# Patient Record
Sex: MALE | Race: WHITE | Employment: OTHER | URBAN - METROPOLITAN AREA
[De-identification: names, ages, dates, MRNs, and addresses within clinical notes are randomized per-mention and may not be internally consistent; named-entity substitution may affect disease eponyms.]

---

## 2017-10-16 ENCOUNTER — HOSPITAL ENCOUNTER (OUTPATIENT)
Dept: PHYSICAL THERAPY | Age: 72
Discharge: HOME | End: 2017-10-16
Payer: MEDICARE

## 2017-10-16 PROCEDURE — G8984 CARRY CURRENT STATUS: HCPCS

## 2017-10-16 PROCEDURE — G8985 CARRY GOAL STATUS: HCPCS

## 2017-10-16 PROCEDURE — 97140 MANUAL THERAPY 1/> REGIONS: CPT

## 2017-10-16 PROCEDURE — 97161 PT EVAL LOW COMPLEX 20 MIN: CPT

## 2017-10-16 NOTE — PROGRESS NOTES
[facility]rapy Center at [facility]   [address], [address]  [phone]   [phone]    Sarah Lewis  : 1945       OUTPATIENT PHYSICAL THERAPY:Initial Assessment 10/16/2017    ICD-10: Treatment Diagnosis:   M54.2 Cervicalgia     Precautions/Allergies:   Review of patient's allergies indicates no known allergies. Fall Risk Score: 1 (? 5 = High Risk)  MD Orders: Eval and treat MEDICAL/REFERRING DIAGNOSIS:  Cervicalgia [M54.2]   DATE OF ONSET: Aug 10, 2017  REFERRING PHYSICIAN: Carlitos Howard MD  RETURN PHYSICIAN APPOINTMENT: 6 months     INITIAL ASSESSMENT:  Mr. Vish Glynn presents     PROBLEM LIST (Impacting functional limitations):  1. Increased Pain  2. Decreased Flexibility/Joint Mobility INTERVENTIONS PLANNED:  1. Home Exercise Program (HEP)  2. Manual Therapy  3. Therapeutic Activites  4. Therapeutic Exercise/Strengthening   TREATMENT PLAN:  Effective Dates: 10/16/17 TO 18. Frequency/Duration: 1-2 visits if needed upon reassessment,  will adjust frequency and duration as progress indicates. GOALS: (Goals have been discussed and agreed upon with patient.)  Short-Term Functional Goals: Time Frame:   1. Establish independent HEP with no cueing. Discharge Goals: Time Frame:   1. Improve score on  Rehabilitation Potential For Stated Goals: Excellent  Regarding Braulio Hudson's therapy, I certify that the treatment plan above will be carried out by a therapist or under their direction. Thank you for this referral,  Talib Ren, PT     Referring Physician Signature: Carlitos Howard MD              Date                    The information in this section was collected on 10/16/17 (except where otherwise noted). HISTORY:   History of Present Injury/Illness (Reason for Referral):    Past Medical History/Comorbidities:   Mr. Vish Glynn  has a past medical history of Acute prostatitis; BPH (benign prostatic hypertrophy);  Elevated prostate specific antigen (PSA); Hypertrophy of prostate without urinary obstruction and other lower urinary tract symptoms (LUTS); and Urinary tract infection, site not specified. Mr. Carlton Gonzalez  has a past surgical history that includes prostate biopsy, needle, saturation sampling and tonsillectomy. Social History/Living Environment:      Prior Level of Function/Work/Activity:    Dominant Side:         RIGHT    Current Medications:       Current Outpatient Prescriptions:     naproxen sodium (ALEVE) 220 mg tablet, Take 220 mg by mouth two (2) times a day., Disp: , Rfl:     finasteride (PROSCAR) 5 mg tablet, TAKE 1 TABLET EVERY DAY, Disp: 90 Tab, Rfl: 11    aspirin (ASPIRIN) 325 mg tablet, Take 325 mg by mouth daily. 1/2 tab po daily, Disp: , Rfl:     multivitamin (ONE A DAY) tablet, Take 1 Tab by mouth daily. , Disp: , Rfl:     acebutolol (SECTRAL) 400 mg capsule, Take  by mouth two (2) times a day., Disp: , Rfl:     omeprazole (PRILOSEC) 20 mg capsule, Take 20 mg by mouth daily. , Disp: , Rfl:     OMEGA-3 FATTY ACIDS (FISH OIL CONCENTRATE PO), Take 1,000 mg by mouth., Disp: , Rfl:    Date Last Reviewed:  10/16/2017     Number of Personal Factors/Comorbidities that affect the Plan of Care: 1-2: MODERATE COMPLEXITY   EXAMINATION:   Observation/Orthostatic Postural Assessment:            Palpation:                ROM:                                            Strength:                          Special Tests:   Neurological Screen:   Balance: Body Structures Involved:  1. Bones  2. Joints  3. Muscles  4. Ligaments Body Functions Affected:  1. Sensory/Pain  2. Neuromusculoskeletal  3. Movement Related Activities and Participation Affected:  1. Learning and Applying Knowledge  2. General Tasks and Demands  3. Mobility  4. Domestic Life  5.  Community, Social and [place]   Number of elements (examined above) that affect the Plan of Care: 3: MODERATE COMPLEXITY   CLINICAL PRESENTATION:   Presentation: Evolving clinical presentation with changing clinical characteristics: MODERATE COMPLEXITY   CLINICAL DECISION MAKING:   Outcome Measure: Tool Used: Neck Disability Index (NDI)  Score:  Initial: 4/50  Most Recent: X/50 (Date: -- )   Interpretation of Score: The Neck Disability Index is a revised form of the Oswestry Low Back Pain Index and is designed to measure the activities of daily living in person's with neck pain. Each section is scored on a 0-5 scale, 5 representing the greatest disability. The scores of each section are added together for a total score of 50. Score 0 1-10 11-20 21-30 31-40 41-49 50   Modifier CH CI CJ CK CL CM CN     ? Carrying, Moving, and Handling Objects:     - CURRENT STATUS: CI - 1%-19% impaired, limited or restricted    - GOAL STATUS: CI - 1%-19% impaired, limited or restricted    - D/C STATUS:  ---------------To be determined---------------      Medical Necessity:   · Patient is expected to demonstrate progress in strength and range of motion to increase independence with in home and community activities. Reason for Services/Other Comments:  · Patient continues to require skilled intervention due to pain at cervical region. Use of outcome tool(s) and clinical judgement create a POC that gives a: Clear prediction of patient's progress: LOW COMPLEXITY            TREATMENT:   (In addition to Assessment/Re-Assessment sessions the following treatments were rendered)  Pre-treatment Symptoms/Complaints:  Pain and tightness in cervical region  Pain: Initial: Pain Intensity 1: 5  Post Session:  1     THERAPEUTIC EXERCISE: (0 minutes):  Exercises per grid below to improve mobility and strength. Required moderate verbal and manual cues to promote proper body alignment, promote proper body posture and promote proper body mechanics. Progressed resistance, range and repetitions as indicated.     Evaluation (  xx   ):    Manual Therapy (      ):     Therapeutic Modalities:    HEP: As above; handouts given to patient for all exercises. Treatment/Session Assessment:    · Response to Treatment:  . · Compliance with Program/Exercises: Will assess as treatment progresses. · Recommendations/Intent for next treatment session: \"Next visit will focus on pain and ROM at neck if needed  Otherwise DC\".   Total Treatment Duration:  PT Patient Time In/Time Out  Time In: 0900  Time Out: 1000  Treatment number [address], PT

## 2017-10-16 NOTE — PROGRESS NOTES
Ambulatory/Rehab Services H2 Model Falls Risk Assessment    Risk Factor Pts. ·   Confusion/Disorientation/Impulsivity  []    4 ·   Symptomatic Depression  []   2 ·   Altered Elimination  []   1 ·   Dizziness/Vertigo  []   1 ·   Gender (Male)  [x]   1 ·   Any administered antiepileptics (anticonvulsants):  []   2 ·   Any administered benzodiazepines:  []   1 ·   Visual Impairment (specify):  []   1 ·   Portable Oxygen Use  []   1 ·   Orthostatic ? BP  []   1 ·   History of Recent Falls (within 3 mos.)  []   5     Ability to Rise from Chair (choose one) Pts. ·   Ability to rise in a single movement  [x]   0 ·   Pushes up, successful in one attempt  []   1 ·   Multiple attempts, but successful  []   3 ·   Unable to rise without assistance  []   4   Total: (5 or greater = High Risk) 1     Falls Prevention Plan:   []                Physical Limitations to Exercise (specify):   []                Mobility Assistance Device (type):   []                Exercise/Equipment Adaptation (specify):    ©2010 [facility] [address] Patent #[id number].  Federal Law prohibits the replication, distribution or use without written permission from [facility]

## 2022-06-28 ENCOUNTER — HOSPITAL ENCOUNTER (OUTPATIENT)
Dept: PHYSICAL THERAPY | Age: 77
Setting detail: RECURRING SERIES
Discharge: HOME | End: 2022-07-01
Payer: MEDICARE

## 2022-06-28 PROCEDURE — 97140 MANUAL THERAPY 1/> REGIONS: CPT

## 2022-06-28 PROCEDURE — 97161 PT EVAL LOW COMPLEX 20 MIN: CPT

## 2022-06-28 PROCEDURE — 97110 THERAPEUTIC EXERCISES: CPT

## 2022-06-28 ASSESSMENT — PAIN SCALES - GENERAL: PAINLEVEL_OUTOF10: 4

## 2022-06-28 NOTE — PLAN OF CARE
Meganil Shoulder  : 1945  Primary: [facility] [employer]  Secondary:  [address] @ [facility]  [address] [place] [address] [facility] [address]  Phone: [phone]  Fax: [phone] Plan Frequency: 2x/week for details    Plan of Care/Certification Expiration Date: 22      PT Visit Info:    No data recorded    OUTPATIENT PHYSICAL THERAPY:OP NOTE TYPE: Initial Assessment 2022               Episode  Appt Desk         Treatment Diagnosis:  Pain in left hip (M25.552)  Stiffness of Left Hip, Not elsewhere classified (M25.652)  * No diagnoses found *  Medical/Referring Diagnosis:  No admission diagnoses are documented for this encounter. Referring Physician:  Salazar Sanz MD MD Orders:  PT Eval and Treat   Return MD Appt:    Date of Onset:  Onset Date: 22     Allergies:  Patient has no allergy information on record. Restrictions/Precautions:    No data recordedNo data recorded   Medications Last Reviewed:  2022     SUBJECTIVE   History of Injury/Illness (Reason for Referral):  Pt states he is having L inner thigh pain since 2022. Pain is located only in the left inner quad, more proximal to midline. Pain is worst in the morning and takes 30min-2hr to subside. Pain described as a little sharp, stronger than dull. Denies numbness/tingling, denies R sided pain. Does not recall any DAVI, does not recall any change in overall activity level. Aggs: sleeping on stomach, squats    Patient Stated Goal(s):  \"Return to prior level of function\"  Initial:     4/10 Post Session:     2/10  Past Medical History/Comorbidities:   Mr. Magdy Moore  has no past medical history on file. Mr. Magdy Moore  has no past surgical history on file.   Social History/Living Environment:   Lives With: Spouse  Type of Home: House     Prior Level of Function/Work/Activity:   Prior level of function: Yard work, house work  Prior level of function: Independent  Occupation: Retired  No data recordedNo data recorded   Learning:   Does the patient/guardian have any barriers to learning?: No barriers  Will there be a co-learner?: No  What is the preferred language of the patient/guardian?: English  Is an  required?: No  How does the patient/guardian prefer to learn new concepts?: Listening; Reading; Demonstration; Pictures/Videos     Fall Risk Scale: Total Score: 0  Foley Fall Risk: Low (0-24)     Personal Factors:        Sex:  male        Age:  68 y.o.        OBJECTIVE   Observation  Posture: normal  Gait: normal      ROM   Right Left   Flexion 120 120   Extension 10 10   ER 50 50   IR 30 25*     Strength (all MMT scores are graded on a scale of 0-5)   Right Left   Hip      Flexion 5 5   Abduction 4+ 4+   Extension 5 4   Glute Max 5 4   ER 5 5   IR 5 5   Knee     Extension 5 5   Flexion 5 5         Joint/Soft Tissue Mobility   Description   Joint Mobility  Hip: normal, no pain   Lumbar: normal, no pain   Knee:   Soft Tissue Mobility No tone or TP       Special Tests   Right Left   FADDIR - +   HELDER - +   Scour's - +   Log Roll - +     Functional Mobility Screen   DL Squat: normal, no pain x10      ASSESSMENT   Initial Assessment:  Mr. Soham Hardy presents to physical therapy with MD diagnosis of left hip pain. Pt demonstrated signs and symptoms consistent with this diagnosis. On objective examination, the patient demonstrated deficits in ROM, strength, joint mobility, soft tissue mobility, functional ability and functional mobility. The patient also had increased pain. The patient is limited in the following activities: walking, sitting, standing, lifting, bending, ADLs, functional tasks. The patient has a good  prognosis for recovery based on the examination findings and may be limited by: N/A. Patient requires skilled physical therapy services in order to return to prior level of function. Problem List: (Impacting functional limitations):     Body Structures, Functions, Activity Limitations Requiring Skilled Therapeutic Intervention: Decreased functional mobility ; Decreased ADL status; Decreased ROM; Decreased body mechanics; Decreased strength; Decreased endurance; Decreased high-level IADLs; Increased pain; Decreased posture     Therapy Prognosis:   Therapy Prognosis: Good     Assessment Complexity:   Low Complexity  PLAN   Effective Dates: 06/28/22 TO Plan of Care/Certification Expiration Date: 09/26/22     Frequency/Duration: Plan Frequency: 2x/week for details     Interventions Planned (Treatment may consist of any combination of the following):    Current Treatment Recommendations: Strengthening; ROM; Functional mobility training; ADL/Self-care training; Endurance training; Neuromuscular re-education; Manual Therapy - Soft Tissue Mobilization; Manual Therapy - Joint Manipulation; Pain management; Home exercise program; Safety education & training; Patient/Caregiver education & training; Modalities; Integrated dry needling; Therapeutic activities     Goals: (Goals have been discussed and agreed upon with patient.)  Discharge Goals: Time Frame: 5 weeks  1. Patient will be compliant with progressive HEP  2. Pt will be able to do 3x10 squats without pain         Outcome Measure: Tool Used: Lower Extremity Functional Scale (LEFS)  Score:  Initial: 75/80 Most Recent: X/80 (Date: -- )   Interpretation of Score: 20 questions each scored on a 5 point scale with 0 representing \"extreme difficulty or unable to perform\" and 4 representing \"no difficulty\". The lower the score, the greater the functional disability. 80/80 represents no disability. Minimal detectable change is 9 points. Medical Necessity:    Patient is expected to demonstrate progress in strength, range of motion, coordination and functional technique to increase independence with ADLs and functional tasks.   Reason For Services/Other Comments:   Patient requires skilled physical therapy in order to return to prior level of function  Total Duration:       Regarding Rico Cole's therapy, I certify that the treatment plan above will be carried out by a therapist or under their direction.   Thank you for this referral,  Jenn Marion, PT     Referring Physician Signature: Azael Rod MD _______________________________ Date _____________        Post Session Pain  Charge Capture  PT Visit Info  POC Link  Treatment Note Link  MD Guidelines  MyChart

## 2022-06-28 NOTE — PROGRESS NOTES
Evon Vasquez  : 1945  Primary: [facility] [employer]  Secondary:  [address] @ [facility]  [address] [place] [address] [address]  Phone: [phone]  Fax: [phone] Plan Frequency: 2x/week for details    Plan of Care/Certification Expiration Date: 22      PT Visit Info:   No data recorded    OUTPATIENT PHYSICAL THERAPY:OP NOTE TYPE: Treatment Note 2022       Episode  }Appt Desk              Treatment Diagnosis:  Pain in left hip (M25.552)  Stiffness of Left Hip, Not elsewhere classified (M25.652)  Medical/Referring Diagnosis:  No admission diagnoses are documented for this encounter. Referring Physician:  Judit Gray MD MD Orders:  PT Eval and Treat   Date of Onset:  Onset Date: 22     Allergies:   Patient has no allergy information on record. Restrictions/Precautions:  No data recordedNo data recorded   Interventions Planned (Treatment may consist of any combination of the following):    Current Treatment Recommendations: Strengthening; ROM; Functional mobility training; ADL/Self-care training; Endurance training; Neuromuscular re-education; Manual Therapy - Soft Tissue Mobilization; Manual Therapy - Joint Manipulation; Pain management; Home exercise program; Safety education & training; Patient/Caregiver education & training; Modalities; Integrated dry needling; Therapeutic activities     Subjective Comments:  See Evaluation Note from today for details  Initial:}    4/10Post Session:       2/10  Medications Last Reviewed:  2022  Updated Objective Findings:  See evaluation note from today  Treatment   THERAPEUTIC EXERCISE: (15 minutes):    Exercises per grid below to improve mobility, strength and balance. Required minimal visual, verbal, manual and tactile cues to promote proper body posture and promote proper body mechanics. Progressed resistance, range, repetitions and complexity of movement as indicated.      Date:   Date: Date:     Activity/Exercise Parameters Parameters Parameters   Education Plan of car, prognosis, anatomy, HEP     NuStep      Lumbar Rotation x15 B     SKTC [id number] B     Bridges 2x10                   MANUAL THERAPY: (15 minutes):   Joint mobilization, Soft tissue mobilization and Manipulation was utilized and necessary because of the patient's restricted joint motion, painful spasm, loss of articular motion and restricted motion of soft tissue. Date:  6/27 Date:   Date:     Activity/Exercise Parameters Parameters Parameters   Soft Tissue      Joint Mobilization Hip: Lateral/post-inf grades III-IV     Dry Needling            Treatment/Session Summary:    · Treatment Assessment:  See Evaluation Note from today for details  · Communication/Consultation:  None today  · Equipment provided today:  None  · Recommendations/Intent for next treatment session: Next visit will focus on progression as tolerated.     Total Treatment Billable Duration:  45 minutes   Time In: 8209  Time Out: [address], PT       Charge Capture  }Post Session Pain  PT Visit Info  [facility] Portal  MD Guidelines  Scanned Media  Benefits  MyChart    Future Appointments   Date Time Provider Deshawn Correa   6/30/2022 11:00 AM Aleah Martines, PT [facility]   7/5/2022  3:15 PM Aleah Martines, PT [facility]   7/7/2022  9:30 AM Aleah Martines, PT [facility]   7/18/2022 11:00 AM Aleah Martines, PT [facility]   7/21/2022  9:30 AM Aleah Martines, PT [facility]

## 2022-06-30 ENCOUNTER — HOSPITAL ENCOUNTER (OUTPATIENT)
Dept: PHYSICAL THERAPY | Age: 77
Setting detail: RECURRING SERIES
Discharge: HOME | End: 2022-07-03
Payer: MEDICARE

## 2022-06-30 PROCEDURE — 97110 THERAPEUTIC EXERCISES: CPT

## 2022-06-30 PROCEDURE — 97140 MANUAL THERAPY 1/> REGIONS: CPT

## 2022-06-30 ASSESSMENT — PAIN SCALES - GENERAL: PAINLEVEL_OUTOF10: 0

## 2022-06-30 NOTE — PROGRESS NOTES
Casimiro Dwyer  : 1945  Primary: [facility] [employer]  Secondary:  [address] @ [facility]  [address] [facility] [place] [address]  Phone: [phone]  Fax: [phone] Plan Frequency: 2x/week for details    Plan of Care/Certification Expiration Date: 22      PT Visit Info:   No data recorded    OUTPATIENT PHYSICAL THERAPY:OP NOTE TYPE: Treatment Note 2022       Episode  }Appt Desk              Treatment Diagnosis:  Pain in left hip (M25.552)  Stiffness of Left Hip, Not elsewhere classified (M25.652)  Medical/Referring Diagnosis:  No admission diagnoses are documented for this encounter. Referring Physician:  Edd Villalta MD MD Orders:  PT Eval and Treat   Date of Onset:  Onset Date: 22     Allergies:   Patient has no allergy information on record. Restrictions/Precautions:  No data recordedNo data recorded   Interventions Planned (Treatment may consist of any combination of the following):    Current Treatment Recommendations: Strengthening; ROM; Functional mobility training; ADL/Self-care training; Endurance training; Neuromuscular re-education; Manual Therapy - Soft Tissue Mobilization; Manual Therapy - Joint Manipulation; Pain management; Home exercise program; Safety education & training; Patient/Caregiver education & training; Modalities; Integrated dry needling; Therapeutic activities     Subjective Comments:  After I came and exercised I was quite sore. I did my exercises this morning - I didn't do them in bed and laid on the floor. Initial:}    0/10Post Session:       0/10  Medications Last Reviewed:  2022  Updated Objective Findings:  See evaluation note from today  Treatment   THERAPEUTIC EXERCISE: (40 minutes):    Exercises per grid below to improve mobility, strength and balance. Required minimal visual, verbal, manual and tactile cues to promote proper body posture and promote proper body mechanics.   Progressed resistance, range, repetitions and complexity of movement as indicated. Date:  6/28 Date:  6/30 Date:     Activity/Exercise Parameters Parameters Parameters   Education Plan of car, prognosis, anatomy, HEP     NuStep  10 minutes, level 4    Lumbar Rotation x15 B 20x    SKTC [id number] B 30\"x3    Bridges 2x10 10x10\"    Gastroc Stretch  30\"x3    Side to side ambulation  No resistance, can likely add next visit. -     HRs  20x    Clamshells  10x10\" L LE    SLS  30\"x3                        MANUAL THERAPY: (2 minutes):   Joint mobilization, Soft tissue mobilization and Manipulation was utilized and necessary because of the patient's restricted joint motion, painful spasm, loss of articular motion and restricted motion of soft tissue. Date:  6/27 Date:   Date:     Activity/Exercise Parameters Parameters Parameters   Soft Tissue      Joint Mobilization Hip: Lateral/post-inf grades III-IV LAD Grade IV-     Dry Needling NOT TODAY            Treatment/Session Summary:    · Treatment Assessment:  Progress strengthening and ROM per POC. Tolerated exercises very well - reviewed sleeping positions and vaerbalized understanding. · Communication/Consultation:  None today  · Equipment provided today:  None  · Recommendations/Intent for next treatment session: Next visit will focus on progression as tolerated.     Total Treatment Billable Duration:  42 minutes   Time In: 6003  Time Out: [address]  }Post Session Pain  PT Visit Info  [facility] [place] [facility]  MD Guidelines  Scanned Media  Benefits  MyChart    Future Appointments   Date Time Provider Deshawn Correa   7/5/2022  3:15 PM CRISTIN Mancilla   7/7/2022  9:30 AM CRISTIN Mancilla   7/18/2022 11:00 AM CRISTIN Mancilla [facility]   7/21/2022  9:30 AM CRISTIN Mancilla

## 2022-07-05 ENCOUNTER — HOSPITAL ENCOUNTER (OUTPATIENT)
Dept: PHYSICAL THERAPY | Age: 77
Setting detail: RECURRING SERIES
Discharge: HOME | End: 2022-07-08
Payer: MEDICARE

## 2022-07-05 PROCEDURE — 97110 THERAPEUTIC EXERCISES: CPT

## 2022-07-05 PROCEDURE — 97140 MANUAL THERAPY 1/> REGIONS: CPT

## 2022-07-05 ASSESSMENT — PAIN SCALES - GENERAL: PAINLEVEL_OUTOF10: 0

## 2022-07-05 NOTE — PROGRESS NOTES
Silvia Wells  : 1945  Primary: [place] [employer]age [employer]  Secondary:  [address] @ [facility]  [address] [facility] Jenny [place]es [place] [address]  Phone: [phone]  Fax: [phone] Plan Frequency: 2x/week for details    Plan of Care/Certification Expiration Date: 22      PT Visit Info:   No data recorded    OUTPATIENT PHYSICAL THERAPY:OP NOTE TYPE: Treatment Note 2022       Episode  }Appt Desk              Treatment Diagnosis:  Pain in left hip (M25.552)  Stiffness of Left Hip, Not elsewhere classified (M25.652)  Medical/Referring Diagnosis:  No admission diagnoses are documented for this encounter. Referring Physician:  Yolande Mcardle, MD MD Orders:  PT Eval and Treat   Date of Onset:  Onset Date: 22     Allergies:   Patient has no allergy information on record. Restrictions/Precautions:  No data recordedNo data recorded   Interventions Planned (Treatment may consist of any combination of the following):    Current Treatment Recommendations: Strengthening; ROM; Functional mobility training; ADL/Self-care training; Endurance training; Neuromuscular re-education; Manual Therapy - Soft Tissue Mobilization; Manual Therapy - Joint Manipulation; Pain management; Home exercise program; Safety education & training; Patient/Caregiver education & training; Modalities; Integrated dry needling; Therapeutic activities     Subjective Comments:   Pt reports his hip has been feeling a little more sore recently but is feeling good today. Initial:}    0/10Post Session:       0/10  Medications Last Reviewed:  2022  Updated Objective Findings:  None Today  Treatment   THERAPEUTIC EXERCISE: (35 minutes):    Exercises per grid below to improve mobility, strength and balance. Required minimal visual, verbal, manual and tactile cues to promote proper body posture and promote proper body mechanics.   Progressed resistance, range, repetitions and complexity of movement as indicated. Date:  6/28 Date:  6/30 Date:  07/05   Activity/Exercise Parameters Parameters Parameters   Education Plan of car, prognosis, anatomy, HEP     NuStep  10 minutes, level 4 8 min lvl 4   Lumbar Rotation x15 B 20x x20   SKTC [id number] B 30\"x3    Bridges 2x10 10x10\" [id number]   Gastroc Stretch  30\"x3    Side to side ambulation  No resistance, can likely add next visit. -     HRs  20x    Clamshells  10x10\" L LE    SLS  30\"x3    S/L Hip Adduction   3x10 2lbs Ankle   Shuttle Press   3x15 150lbs   Hip Abduction   1x10 20lbs     MANUAL THERAPY: (10 minutes):   Joint mobilization, Soft tissue mobilization and Manipulation was utilized and necessary because of the patient's restricted joint motion, painful spasm, loss of articular motion and restricted motion of soft tissue. Date:  6/27 Date:   Date:     Activity/Exercise Parameters Parameters Parameters   Soft Tissue      Joint Mobilization Hip: Lateral/post-inf grades III-IV LAD Grade IV-  Hip: Lateral/post-inf grades III-IV  LAD Grade III   Dry Needling NOT TODAY            Treatment/Session Summary:    · Treatment Assessment:  Pt tolerated treatment well with continuation of strengthening exercises. · Communication/Consultation:  None today  · Equipment provided today:  None  · Recommendations/Intent for next treatment session: Next visit will focus on progression as tolerated.     Total Treatment Billable Duration:  42 minutes   Time In: 1254  Time Out: Deshawn Curtis       Charge Capture  }Post Session Pain  PT Visit Info  [address] [facility]  MD Guidelines  Scanned Media  Benefits  MyChart    Future Appointments   Date Time Provider Deshawn Correa   7/7/2022  9:30 AM CRISTIN Chan   7/18/2022 11:00 AM RCISTIN Chan [facility]   7/21/2022  9:30 AM CRISTIN Chan

## 2022-07-07 ENCOUNTER — HOSPITAL ENCOUNTER (OUTPATIENT)
Dept: PHYSICAL THERAPY | Age: 77
Setting detail: RECURRING SERIES
Discharge: HOME | End: 2022-07-10
Payer: MEDICARE

## 2022-07-07 PROCEDURE — 97140 MANUAL THERAPY 1/> REGIONS: CPT

## 2022-07-07 PROCEDURE — 97110 THERAPEUTIC EXERCISES: CPT

## 2022-07-07 ASSESSMENT — PAIN SCALES - GENERAL: PAINLEVEL_OUTOF10: 0

## 2022-07-07 NOTE — PROGRESS NOTES
Evon Vasquez  : 1945  Primary: [facility] [employer] Advantage Hmo  Secondary:  [address] @ [facility]  [address] [place] [address]  Phone: [phone]  Fax: [phone] Plan Frequency: 2x/week for details    Plan of Care/Certification Expiration Date: 22      PT Visit Info:   No data recorded    OUTPATIENT PHYSICAL THERAPY:OP NOTE TYPE: Treatment Note 2022       Episode  }Appt Desk              Treatment Diagnosis:  Pain in left hip (M25.552)  Stiffness of Left Hip, Not elsewhere classified (M25.652)  Medical/Referring Diagnosis:  No admission diagnoses are documented for this encounter. Referring Physician:  Judit Gray MD MD Orders:  PT Eval and Treat   Date of Onset:  Onset Date: 22     Allergies:   Patient has no allergy information on record. Restrictions/Precautions:  No data recordedNo data recorded   Interventions Planned (Treatment may consist of any combination of the following):    Current Treatment Recommendations: Strengthening; ROM; Functional mobility training; ADL/Self-care training; Endurance training; Neuromuscular re-education; Manual Therapy - Soft Tissue Mobilization; Manual Therapy - Joint Manipulation; Pain management; Home exercise program; Safety education & training; Patient/Caregiver education & training; Modalities; Integrated dry needling; Therapeutic activities     Subjective Comments:  Pt reports his hip has been feeling a lot better and has not needed any medication in the last two days. Initial:}    0/10Post Session:       0/10  Medications Last Reviewed:  2022  Updated Objective Findings:  None Today  Treatment   THERAPEUTIC EXERCISE: (35 minutes):    Exercises per grid below to improve mobility, strength and balance. Required minimal visual, verbal, manual and tactile cues to promote proper body posture and promote proper body mechanics.   Progressed resistance, range, repetitions and complexity of movement as indicated. Date:  6/30 Date:  07/05 Date:  07/07   Activity/Exercise Parameters Parameters    Education      NuStep 10 minutes, level 4 8 min lvl 4 10min lvl 4   Lumbar Rotation 20x x20 x20   SKTC 30\"x3     Bridges 10x10\" [id number]   Gastroc Stretch 30\"x3     Side to side ambulation No resistance, can likely add next visit. -      HRs 20x  x20   Clamshells 10x10\" L LE     SLS 30\"x3     S/L Hip Adduction  3x10 2lbs Ankle 3x10 2lbs Ankle   Shuttle Press  3x15 150lbs 3x15 150lbs DL  2x10 75 SL   Hip Abduction  1x10 20lbs 2x10 30lbs     MANUAL THERAPY: (10 minutes):   Joint mobilization, Soft tissue mobilization and Manipulation was utilized and necessary because of the patient's restricted joint motion, painful spasm, loss of articular motion and restricted motion of soft tissue. Date:  6/27 Date:   Date:  07/07   Activity/Exercise Parameters Parameters Parameters   Soft Tissue      Joint Mobilization Hip: Lateral/post-inf grades III-IV LAD Grade IV-  Hip: Lateral/post-inf grades III-IV  LAD Grade III   Dry Needling NOT TODAY            Treatment/Session Summary:    · Treatment Assessment:  Pt tolerated treatment well with continuation of manual and strength training. · Communication/Consultation:  None today  · Equipment provided today:  None  · Recommendations/Intent for next treatment session: Next visit will focus on progression as tolerated.     Total Treatment Billable Duration:  42 minutes   Time In: 0930  Time Out: [address]       Charge Capture  }Post Session Pain  PT Visit [facility] Portal  MD Guidelines  Scanned Media  Benefits  MyChart    Future Appointments   Date Time Provider Deshawn Correa   7/18/2022 11:00 AM CRISTIN Montero [facility]   7/21/2022  9:30 AM CRISTIN Montero [facility]

## 2022-07-18 ENCOUNTER — HOSPITAL ENCOUNTER (OUTPATIENT)
Dept: PHYSICAL THERAPY | Age: 77
Setting detail: RECURRING SERIES
Discharge: HOME | End: 2022-07-21
Payer: MEDICARE

## 2022-07-18 PROCEDURE — 97110 THERAPEUTIC EXERCISES: CPT

## 2022-07-18 PROCEDURE — 97140 MANUAL THERAPY 1/> REGIONS: CPT

## 2022-07-18 ASSESSMENT — PAIN SCALES - GENERAL: PAINLEVEL_OUTOF10: 1

## 2022-07-18 NOTE — PROGRESS NOTES
Mary Ellen nAn  : 1945  Primary: Basilio [place] [employer]age Hmo  Secondary:  Madeline Aragon @ [place] [address] [address]  Phone: [phone]  Fax: [phone] Plan Frequency: 2x/week for details    Plan of Care/Certification Expiration Date: 22      PT Visit Info:   No data recorded    OUTPATIENT PHYSICAL THERAPY:OP NOTE TYPE: Treatment Note 2022       Episode  }Appt Desk              Treatment Diagnosis:  Pain in left hip (M25.552)  Stiffness of Left Hip, Not elsewhere classified (M25.652)  Medical/Referring Diagnosis:  No admission diagnoses are documented for this encounter. Referring Physician:  Cami Barcenas MD MD Orders:  PT Eval and Treat   Date of Onset:  Onset Date: 22     Allergies:   Patient has no allergy information on record. Restrictions/Precautions:  No data recordedNo data recorded   Interventions Planned (Treatment may consist of any combination of the following):    Current Treatment Recommendations: Strengthening; ROM; Functional mobility training; ADL/Self-care training; Endurance training; Neuromuscular re-education; Manual Therapy - Soft Tissue Mobilization; Manual Therapy - Joint Manipulation; Pain management; Home exercise program; Safety education & training; Patient/Caregiver education & training; Modalities; Integrated dry needling; Therapeutic activities     Subjective Comments:  Pt reports his hip has been bothering him more this past week when he was on vacation. Initial:}    1/10Post Session:       1/10  Medications Last Reviewed:  2022  Updated Objective Findings:  None Today  Treatment   THERAPEUTIC EXERCISE: (35 minutes):    Exercises per grid below to improve mobility, strength and balance. Required minimal visual, verbal, manual and tactile cues to promote proper body posture and promote proper body mechanics.   Progressed resistance, range, repetitions and complexity of movement as indicated. Date:  07/05 Date:  07/07 Date:  07/18   Activity/Exercise Parameters     Education      NuStep 8 min lvl 4 10min lvl 4 10min lvl 4   Lumbar Rotation x20 x20 X20    SKTC      Bridges [id number]   Gastroc Stretch      Side to Side ambulation      Hrs  x20    Clamshells      SLS      S/L Hip Adduction 3x10 2lbs Ankle 3x10 2lbs Ankle    Shuttle Press 3x15 150lbs 3x15 150lbs DL  2x10 75 SL 3x15 150lbs DL  2x10 75lbs SL   Hip Abduction 1x10 20lbs 2x10 30lbs 2x10 30 lbs   Hip Flexion   2x10 30 lbs     MANUAL THERAPY: (10 minutes):   Joint mobilization, Soft tissue mobilization and Manipulation was utilized and necessary because of the patient's restricted joint motion, painful spasm, loss of articular motion and restricted motion of soft tissue. Date:   Date:  07/07 Date:  07/18   Activity/Exercise Parameters Parameters    Soft Tissue      Joint Mobilization LAD Grade IV-  Hip: Lateral/post-inf grades III-IV  LAD Grade III Hip:   Lateral/post-inf grades III-IV  LAD grade III   Dry Needling NOT TODAY            Treatment/Session Summary:    Treatment Assessment:  Pt tolerated treatment well with continued strengthening exercises. Communication/Consultation:  None today  Equipment provided today:  None  Recommendations/Intent for next treatment session: Next visit will focus on progression as tolerated.     Total Treatment Billable Duration:  45 minutes   Time In: 1100  Time Out: [address]       Charge Capture  }Post Session Pain  PT Visit [facility] Portal  MD Guidelines  Scanned Media  Benefits  MyChart    Future Appointments   Date Time Provider Deshawn Correa   7/21/2022  9:30 AM Delmi García PT [facility]

## 2022-07-21 ENCOUNTER — HOSPITAL ENCOUNTER (OUTPATIENT)
Dept: PHYSICAL THERAPY | Age: 77
Setting detail: RECURRING SERIES
Discharge: HOME | End: 2022-07-24
Payer: MEDICARE

## 2022-07-21 PROCEDURE — 97140 MANUAL THERAPY 1/> REGIONS: CPT

## 2022-07-21 PROCEDURE — 97110 THERAPEUTIC EXERCISES: CPT

## 2022-07-21 ASSESSMENT — PAIN SCALES - GENERAL: PAINLEVEL_OUTOF10: 1

## 2022-07-21 NOTE — PROGRESS NOTES
Imtiaz Drivers  : 1945  Primary: [address]  Medicare Advantage Hmo  Secondary:  [address] @ [facility]  [address] [facility] [place] Singh [place] [address]  Phone: [phone]  Fax: [phone] Plan Frequency: 2x/week for details    Plan of Care/Certification Expiration Date: 22      PT Visit Info:   No data recorded    OUTPATIENT PHYSICAL THERAPY:OP NOTE TYPE: Treatment Note 2022       Episode  }Appt Desk              Treatment Diagnosis:  Pain in left hip (M25.552)  Stiffness of Left Hip, Not elsewhere classified (M25.652)  Medical/Referring Diagnosis:  No admission diagnoses are documented for this encounter. Referring Physician:  Dalia Camarillo MD MD Orders:  PT Eval and Treat   Date of Onset:  Onset Date: 22     Allergies:   Patient has no allergy information on record. Restrictions/Precautions:  No data recordedNo data recorded   Interventions Planned (Treatment may consist of any combination of the following):    Current Treatment Recommendations: Strengthening; ROM; Functional mobility training; ADL/Self-care training; Endurance training; Neuromuscular re-education; Manual Therapy - Soft Tissue Mobilization; Manual Therapy - Joint Manipulation; Pain management; Home exercise program; Safety education & training; Patient/Caregiver education & training; Modalities; Integrated dry needling; Therapeutic activities     Subjective Comments:  Pt reports his hip was bothering him this morning and he took a tylenol. Initial:}    1/10Post Session:       1/10  Medications Last Reviewed:  2022  Updated Objective Findings:  None Today  Treatment   THERAPEUTIC EXERCISE: (35 minutes):    Exercises per grid below to improve mobility, strength and balance. Required minimal visual, verbal, manual and tactile cues to promote proper body posture and promote proper body mechanics. Progressed resistance, range, repetitions and complexity of movement as indicated.      Date:   Date:  07/18 Date:   07/21   Activity/Exercise      Education      NuStep 10min lvl 4 10min lvl 4 8min lvl 4   Lumbar Rotation x20 X20     SKTC      Bridges [id number] 3x10 green TB w/ clam   Gastroc Stretch      Side to Side ambulation      Hrs x20     Clamshells      SLS      S/L Hip Adduction 3x10 2lbs Ankle     Shuttle Press 3x15 150lbs DL  2x10 75 SL 3x15 150lbs DL  2x10 75lbs SL 3x15 150 lbs DL  2x15 75 lbs SL   Hip Abduction 2x10 30lbs 2x10 30 lbs 2x10 30bs    Hip Flexion  2x10 30 lbs    Squats   2x10 Lateral mobilization Purple TB     MANUAL THERAPY: (10 minutes):   Joint mobilization, Soft tissue mobilization and Manipulation was utilized and necessary because of the patient's restricted joint motion, painful spasm, loss of articular motion and restricted motion of soft tissue. Date:   Date:  07/07 Date:  07/18 Date:  07/21   Activity/Exercise Parameters Parameters     Soft Tissue       Joint Mobilization LAD Grade IV-  Hip: Lateral/post-inf grades III-IV  LAD Grade III Hip:   Lateral/post-inf grades III-IV  LAD grade III Hip: Lateral/post-inf grades III-IV   LAD grade III   Dry Needling NOT TODAY             Treatment/Session Summary:    Treatment Assessment:  Pt tolerated treatment well with good effort in exercises. Continues to have occasional pain in the hip. Communication/Consultation:  None today  Equipment provided today:  None  Recommendations/Intent for next treatment session: Next visit will focus on progression as tolerated.     Total Treatment Billable Duration:  45 minutes   Time In: 0397  Time Out: [place] 87       Charge Capture  }Post Session Pain  PT Visit [facility] Portal  MD Guidelines  Scanned Media  Benefits  MyChart    Future Appointments   Date Time Provider Deshawn Correa   7/26/2022  2:30 PM Yady Cox, PT [facility]   7/29/2022  8:00 AM [address]

## 2022-07-26 ENCOUNTER — HOSPITAL ENCOUNTER (OUTPATIENT)
Dept: PHYSICAL THERAPY | Age: 77
Setting detail: RECURRING SERIES
Discharge: HOME | End: 2022-07-29
Payer: MEDICARE

## 2022-07-26 PROCEDURE — 97140 MANUAL THERAPY 1/> REGIONS: CPT

## 2022-07-26 PROCEDURE — 97110 THERAPEUTIC EXERCISES: CPT

## 2022-07-26 ASSESSMENT — PAIN SCALES - GENERAL: PAINLEVEL_OUTOF10: 1

## 2022-07-26 NOTE — PROGRESS NOTES
Favio Garcia  : 1945  Primary: Marques Sharma Medicare Advantage Hmo  Secondary:  [address] @ [facility]  [address] [place] [facility] [place] [address]  Phone: [phone]  Fax: [phone] Plan Frequency: 2x/week for details    Plan of Care/Certification Expiration Date: 22      PT Visit Info:   No data recorded    OUTPATIENT PHYSICAL THERAPY:OP NOTE TYPE: Treatment Note 2022       Episode  }Appt Desk              Treatment Diagnosis:  Pain in left hip (M25.552)  Stiffness of Left Hip, Not elsewhere classified (M25.652)  Medical/Referring Diagnosis:  No admission diagnoses are documented for this encounter. Referring Physician:  Emily Kovacs MD MD Orders:  PT Eval and Treat   Date of Onset:  Onset Date: 22     Allergies:   Patient has no allergy information on record. Restrictions/Precautions:  No data recordedNo data recorded   Interventions Planned (Treatment may consist of any combination of the following):    Current Treatment Recommendations: Strengthening; ROM; Functional mobility training; ADL/Self-care training; Endurance training; Neuromuscular re-education; Manual Therapy - Soft Tissue Mobilization; Manual Therapy - Joint Manipulation; Pain management; Home exercise program; Safety education & training; Patient/Caregiver education & training; Modalities; Integrated dry needling; Therapeutic activities     Subjective Comments:  Pt reports some soreness in his hip this morning. Initial:}    1/10Post Session:       1/10  Medications Last Reviewed:  2022  Updated Objective Findings:  None Today  Treatment   THERAPEUTIC EXERCISE: (35 minutes):    Exercises per grid below to improve mobility, strength and balance. Required minimal visual, verbal, manual and tactile cues to promote proper body posture and promote proper body mechanics. Progressed resistance, range, repetitions and complexity of movement as indicated.      Date:   Date:    Date:

## 2022-07-29 ENCOUNTER — HOSPITAL ENCOUNTER (OUTPATIENT)
Dept: PHYSICAL THERAPY | Age: 77
Setting detail: RECURRING SERIES
End: 2022-07-29
Payer: MEDICARE

## 2022-07-29 NOTE — PROGRESS NOTES
Rip Leader  : 1945  Primary: [facility] [employer]age [employer]  Secondary:  Praneethbertin Imani @ [facility]  [facility] [address] [address]  Phone: [phone]  Fax: [phone] Plan Frequency: 2x/week for details    Plan of Care/Certification Expiration Date: 22      PT Visit Info:  No data recorded       OUTPATIENT PHYSICAL THERAPY 2022     Ezequiel Kennedy      Mr. Cole cancelled appointment due schedule conflict.     [facility]    Future Appointments   Date Time Provider Deshawn Correa   2022  7:00 PM Taqueriakeith [facility]